# Patient Record
Sex: FEMALE | Race: OTHER | URBAN - METROPOLITAN AREA
[De-identification: names, ages, dates, MRNs, and addresses within clinical notes are randomized per-mention and may not be internally consistent; named-entity substitution may affect disease eponyms.]

---

## 2018-11-20 ENCOUNTER — IMPORTED ENCOUNTER (OUTPATIENT)
Dept: URBAN - METROPOLITAN AREA CLINIC 43 | Facility: CLINIC | Age: 78
End: 2018-11-20

## 2018-11-20 PROBLEM — H16.223: Noted: 2018-11-20

## 2018-12-10 ENCOUNTER — IMPORTED ENCOUNTER (OUTPATIENT)
Dept: URBAN - METROPOLITAN AREA CLINIC 43 | Facility: CLINIC | Age: 78
End: 2018-12-10

## 2018-12-10 PROBLEM — H16.223: Noted: 2018-12-10

## 2019-01-11 ENCOUNTER — IMPORTED ENCOUNTER (OUTPATIENT)
Dept: URBAN - METROPOLITAN AREA CLINIC 43 | Facility: CLINIC | Age: 79
End: 2019-01-11

## 2019-01-11 PROBLEM — H16.223: Noted: 2019-01-11

## 2019-04-05 ENCOUNTER — IMPORTED ENCOUNTER (OUTPATIENT)
Dept: URBAN - METROPOLITAN AREA CLINIC 43 | Facility: CLINIC | Age: 79
End: 2019-04-05

## 2019-04-05 PROBLEM — H16.223: Noted: 2019-04-05

## 2019-04-05 PROBLEM — H02.221: Noted: 2019-04-05

## 2019-06-11 NOTE — PATIENT DISCUSSION
[facility] 36 point screen: I have reviewed the visual fields both taped and untaped on this patient which demonstrate significant obstruction of the patient's peripheral visual field on both eyes.

## 2019-06-14 ENCOUNTER — IMPORTED ENCOUNTER (OUTPATIENT)
Dept: URBAN - METROPOLITAN AREA CLINIC 43 | Facility: CLINIC | Age: 79
End: 2019-06-14

## 2019-08-13 NOTE — PATIENT DISCUSSION
Also, please do not hesitate to call us if you have any concerns not addressed by this information. Please call [phone] and we will do everything we can to help you during this period.

## 2019-10-28 ENCOUNTER — IMPORTED ENCOUNTER (OUTPATIENT)
Dept: URBAN - METROPOLITAN AREA CLINIC 43 | Facility: CLINIC | Age: 79
End: 2019-10-28

## 2020-04-19 ASSESSMENT — VISUAL ACUITY
OS_SC: 20/30+2
OD_SC: J2
OD_CC: 20/25 +3
OS_CC: 20/20 -2
OD_SC: 20/30-1
OD_SC: 20/30 +2
OS_SC: 20/25-2
OS_SC: 20/30-1
OS_SC: J4
OD_SC: 20/20 -1
OD_SC: 20/25-2
OS_SC: 20/25 -3
OD_CC: J1
OS_SC: 20/25-2
OS_SC: 20/25 -1
OS_CC: J1
OD_SC: 20/25-2

## 2020-04-19 ASSESSMENT — TONOMETRY
OD_IOP_MMHG: 15.0
OD_IOP_MMHG: 14.0
OS_IOP_MMHG: 19.0
OD_IOP_MMHG: 19.0
OS_IOP_MMHG: 16.0
OS_IOP_MMHG: 16.0
OS_IOP_MMHG: 14.0
OD_IOP_MMHG: 16.0